# Patient Record
Sex: FEMALE | Race: WHITE | NOT HISPANIC OR LATINO | Employment: OTHER | URBAN - METROPOLITAN AREA
[De-identification: names, ages, dates, MRNs, and addresses within clinical notes are randomized per-mention and may not be internally consistent; named-entity substitution may affect disease eponyms.]

---

## 2024-06-03 NOTE — H&P (VIEW-ONLY)
[facility]      Preoperative Consultation   Sheridan Ahuja   : 1955   Gender: female    Date of Encounter: 6/3/2024    Nursing Notes:   Mikayla Mcdowell  6/3/2024  7:57 AM  Signed  Sheridan Ahuja is a 69 y.o. female (1955) who presents for preop evaluation undergoing Stimulator for bladder.    Date of Surgery: 2024  Surgical Specialty: Urology  Tammi Maxwell MD - [facility]  [facility]Surgical Facility: [facility]  Fax number: [phone]  Surgery type: outpatient  Primary Physician: Gertrudis Aldridge        History of Present Illness   Ms. Sheridan Ahuja presents for preoperative physical.  No chest pain or shortness of breath.  History of Present Illness         Review of Systems   A comprehensive review of systems was negative except for items noted in HPI.    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     calcium carbonate/vitamin D3 (CALTRATE 600 PLUS D ORAL) Take by mouth.     clobetasol 0.05% TOPICAL (TEMOVATE) 0.05 % external solution      CPAP CPAP ()  at 9-13cmw , MASK OF CHOICE ( or ) w/ full face cushion () x1/mo, nasal cushion () x2/mo, or nasal pillows () x 2/mo; Length of Need: 99 months; Frequency of use: Daily  Heated humidifier () x 1/5  year, Humidifier chamber () x 1/6mo, Chinstrap () x 1/6mo, Heated tubing () x 1/3mo, Headgear () x 1/6mo, Filters: Disposable () x 2pk/1mo & Reusable () x 1pk/6mo     fesoterodine (Toviaz) 4 mg Tb24 Extended-Release tablet Toviaz 4 mg tablet,extended release     fluticasone (50 mcg per actuation) nasal solution (FLONASE) Inhale 1 Spray to both nostrils once daily.     hydrocortisone (HYTONE) 2.5 % ointment      multivitamin (VITAMIN DAILY ORAL) Take by mouth. Vitamin D     omeprazole (PRILOSEC) 20 mg Delayed-Release capsule Take 1 Capsule (20 mg) by mouth once daily before a meal.     simvastatin (ZOCOR) 20 mg tablet Take 1 Tablet (20 mg) by mouth once daily in the evening.     triamcinolone (ARISTOCORT) 0.1 % ointment not taking     vibegron (Gemtesa) 75 mg tablet Take 1 Tablet (75 mg) by mouth once daily.     No current facility-administered medications for this visit.     Medications have been reviewed by me and are current to the best of my knowledge and ability.     Allergies   Allergen Reactions     Asa Buff (Mag Carb-Al Glyc) [Aspirin, Buffered] Edema     Aspirin,Buffd-Calcium Carb-Mag *Unknown     Darifenacin Rash     Oxybutynin Rash     Oxybutynin Chloride *Unknown     Past Surgical History:   . Laterality Date     DILATION AND CURETTAGE  2008     KNEE SURGERY Right 2023    ACL and MM repair     Left breast magnetic seed-localized lumpectomy and left axillary sentinel lymph node biopsy  08/15/2022    Dr. Barfield     TOTAL ABDOMINAL HYSTERECTOMY  2009    still has ovaries; uterine prolapse; bladder suspension     Social History     Tobacco Use     Smoking status: Former     Current packs/day: 0.00     Average packs/day: 0.5 packs/day for 10.0 years (5.0 ttl pk-yrs)     Types: Cigarettes     Start date: 1970     Quit date: 1980     Years since quittin.1     Smokeless tobacco: Never     Tobacco comments:     smoked off and on for 10 years   Vaping  "Use     Vaping status: Never Used   Substance Use Topics     Alcohol use: Yes     Alcohol/week: 2.5 - 3.3 standard drinks of alcohol     Types: 3 - 4 Standard drinks or equivalent per week     Drug use: No     Comment: caff: 2 cup coffee in AM     Family History   Problem Relation Age of Onset     Hyperlipidemia Mother      Heart Disease Father         high cholesterol     Cancer-prostate Father      Good Health Sister      Cancer Brother         lymphoma     Other Brother         quadraplegic -- MVA     Good Health Brother      Good Health Brother      Good Health Brother      Diabetes Maternal Grandmother      Cancer-breast Paternal Grandmother      Cancer Paternal Grandfather         unknown     Cancer-ovarian No Family History      Results      PAST DIFFICULTY WITH ANESTHESIA: None     Physical Exam   /60 (Cuff Site: Right Arm, Position: Sitting, Cuff Size: Adult Regular)   Pulse 64   Ht 1.6 m (5' 3\")   Wt 77.7 kg (171 lb 4.8 oz)   BMI 30.34 kg/m   Body mass index is 30.34 kg/m .  Physical Exam    Const: NAD, A&Ox3   Eyes: Anicteric, EOMI   Respiratory: CTAB   Cardiac: RRR, 2+ left leg swelling.  Trace on the right.  No calf tenderness.  Skin: No concerning lesions, rashes or jaundice   Neuro: Gait normal, no tremor  Psych: Appropriate mood      Assessment / Plan     The Pre-Op Tool    Recommendations      Low Risk Procedure    Cardiac History  No history of coronary artery disease    Sleep Apnea  History of obstructive sleep apnea, on CPAP           Labs  UA/UC within previous 7 days  EKG  Not indicated  Stress Testing  Not indicated    * Testing recommendations are intended to assist, but not direct, clinical decisions.            Labs  * Data supports elimination of  routine  laboratory testing in favor of focused,  indicated  testing based on medical co-morbidities. A 2009 study randomized 1061 patients undergoing ambulatory, non-cataract surgery to routine or to indicated testing. Perioperative " adverse events were similar (Anesthesia & Analgesia 2009;108:467-75; Anesthesiol. Clin. 2016 Mar;34(1):43-58).  EKG  * The ACC/AHA recommends against obtaining routine EKGs in patients undergoing low risk surgeries, a class IIa recommendation (JACC. 2014;64(21);e1-76).     Session ID: [id number]  Endnotes and bibliography available upon request: [email or web address]  Labs: yes  ECG: no    ICD-10-CM    1. Medicare annual wellness visit, subsequent  Z00.00       2. Hyperlipidemia, unspecified hyperlipidemia type  E78.5 LIPID PANEL W REFLEX MEASURED LDL      3. Liver lesion  K76.9 HEPATIC FUNCTION PANEL     CANCELED: CBC W PLT NO DIFF      4. Hyperglycemia  R73.9 HEMOGLOBIN A1C SCREENING     BASIC METABOLIC PANEL      5. Vitamin D deficiency  E55.9 VITAMIN D 25 (DEFICIENCY)      6. Eustachian tube dysfunction, unspecified laterality  H69.90       7. Non-intractable vomiting with nausea  R11.2       8. Visit for screening mammogram  Z12.31 XR MAMMO BILAT SCREENING [[id number]]      9. Screening for colon cancer  Z12.11 OCCULT BLOOD IFOBT STOOL [[id number]]      10. Screening for diabetes mellitus (DM)  Z13.1 CANCELED: GLUCOSE, FASTING [27309.2]      11. Fatigue, unspecified type  R53.83       12. Left leg swelling  M79.89       13. Leg cramping  R25.2         Assessment & Plan    Patient is cleared for planned procedure.   Electronically Signed by:   Gertrudis Aldridge MD   6/3/2024

## 2024-06-03 NOTE — H&P (VIEW-ONLY)
[facility]      Preoperative Consultation   Sheridan Ahuja   : 1955   Gender: female    Date of Encounter: 6/3/2024    Nursing Notes:   Mikayla Mcdowell  6/3/2024  7:57 AM  Signed  Sheridan Ahuja is a 69 y.o. female (1955) who presents for preop evaluation undergoing Stimulator for bladder.    Date of Surgery: 2024  Surgical Specialty: Urology  Tammi Maxwell MD - [facility]  [facility]/Surgical Facility: [facility]  Fax number: [phone]  Surgery type: outpatient  Primary Physician: Gertrudis Aldridge        History of Present Illness   Ms. Sheridan Ahuja presents for preoperative physical.  No chest pain or shortness of breath.  History of Present Illness         Review of Systems   A comprehensive review of systems was negative except for items noted in HPI.    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     calcium carbonate/vitamin D3 (CALTRATE 600 PLUS D ORAL) Take by mouth.     clobetasol 0.05% TOPICAL (TEMOVATE) 0.05 % external solution      CPAP CPAP ()  at 9-13cmw , MASK OF CHOICE ( or ) w/ full face cushion () x1/mo, nasal cushion () x2/mo, or nasal pillows () x 2/mo; Length of Need: 99 months; Frequency of use: Daily  Heated humidifier () x 1/5  year, Humidifier chamber () x 1/6mo, Chinstrap () x 1/6mo, Heated tubing () x 1/3mo, Headgear () x 1/6mo, Filters: Disposable () x 2pk/1mo & Reusable () x 1pk/6mo     fesoterodine (Toviaz) 4 mg Tb24 Extended-Release tablet Toviaz 4 mg tablet,extended release     fluticasone (50 mcg per actuation) nasal solution (FLONASE) Inhale 1 Spray to both nostrils once daily.     hydrocortisone (HYTONE) 2.5 % ointment      multivitamin (VITAMIN DAILY ORAL) Take by mouth. Vitamin D     omeprazole (PRILOSEC) 20 mg Delayed-Release capsule Take 1 Capsule (20 mg) by mouth once daily before a meal.     simvastatin (ZOCOR) 20 mg tablet Take 1 Tablet (20 mg) by mouth once daily in the evening.     triamcinolone (ARISTOCORT) 0.1 % ointment not taking     vibegron (Gemtesa) 75 mg tablet Take 1 Tablet (75 mg) by mouth once daily.     No current facility-administered medications for this visit.     Medications have been reviewed by me and are current to the best of my knowledge and ability.     Allergies   Allergen Reactions     Asa Buff (Mag Carb-Al Glyc) [Aspirin, Buffered] Edema     Aspirin,Buffd-Calcium Carb-Mag *Unknown     Darifenacin Rash     Oxybutynin Rash     Oxybutynin Chloride *Unknown     Past Surgical History:   . Laterality Date     DILATION AND CURETTAGE  2008     KNEE SURGERY Right 2023    ACL and MM repair     Left breast magnetic seed-localized lumpectomy and left axillary sentinel lymph node biopsy  08/15/2022    Dr. Barfield     TOTAL ABDOMINAL HYSTERECTOMY  2009    still has ovaries; uterine prolapse; bladder suspension     Social History     Tobacco Use     Smoking status: Former     Current packs/day: 0.00     Average packs/day: 0.5 packs/day for 10.0 years (5.0 ttl pk-yrs)     Types: Cigarettes     Start date: 1970     Quit date: 1980     Years since quittin.1     Smokeless tobacco: Never     Tobacco comments:     smoked off and on for 10 years   Vaping  "Use     Vaping status: Never Used   Substance Use Topics     Alcohol use: Yes     Alcohol/week: 2.5 - 3.3 standard drinks of alcohol     Types: 3 - 4 Standard drinks or equivalent per week     Drug use: No     Comment: caff: 2 cup coffee in AM     Family History   Problem Relation Age of Onset     Hyperlipidemia Mother      Heart Disease Father         high cholesterol     Cancer-prostate Father      Good Health Sister      Cancer Brother         lymphoma     Other Brother         quadraplegic -- MVA     Good Health Brother      Good Health Brother      Good Health Brother      Diabetes Maternal Grandmother      Cancer-breast Paternal Grandmother      Cancer Paternal Grandfather         unknown     Cancer-ovarian No Family History      Results      PAST DIFFICULTY WITH ANESTHESIA: None     Physical Exam   /60 (Cuff Site: Right Arm, Position: Sitting, Cuff Size: Adult Regular)   Pulse 64   Ht 1.6 m (5' 3\")   Wt 77.7 kg (171 lb 4.8 oz)   BMI 30.34 kg/m   Body mass index is 30.34 kg/m .  Physical Exam    Const: NAD, A&Ox3   Eyes: Anicteric, EOMI   Respiratory: CTAB   Cardiac: RRR, 2+ left leg swelling.  Trace on the right.  No calf tenderness.  Skin: No concerning lesions, rashes or jaundice   Neuro: Gait normal, no tremor  Psych: Appropriate mood      Assessment / Plan     The Pre-Op Tool    Recommendations      Low Risk Procedure    Cardiac History  No history of coronary artery disease    Sleep Apnea  History of obstructive sleep apnea, on CPAP           Labs  UA/UC within previous 7 days  EKG  Not indicated  Stress Testing  Not indicated    * Testing recommendations are intended to assist, but not direct, clinical decisions.            Labs  * Data supports elimination of  routine  laboratory testing in favor of focused,  indicated  testing based on medical co-morbidities. A 2009 study randomized 1061 patients undergoing ambulatory, non-cataract surgery to routine or to indicated testing. Perioperative " adverse events were similar (Anesthesia & Analgesia 2009;108:467-75; Anesthesiol. Clin. 2016 Mar;34(1):43-58).  EKG  * The ACC/AHA recommends against obtaining routine EKGs in patients undergoing low risk surgeries, a class IIa recommendation (JACC. 2014;64(21);e1-76).     Session ID: [id number]  Endnotes and bibliography available upon request: [email or web address]  Labs: yes  ECG: no    ICD-10-CM    1. Medicare annual wellness visit, subsequent  Z00.00       2. Hyperlipidemia, unspecified hyperlipidemia type  E78.5 LIPID PANEL W REFLEX MEASURED LDL      3. Liver lesion  K76.9 HEPATIC FUNCTION PANEL     CANCELED: CBC W PLT NO DIFF      4. Hyperglycemia  R73.9 HEMOGLOBIN A1C SCREENING     BASIC METABOLIC PANEL      5. Vitamin D deficiency  E55.9 VITAMIN D 25 (DEFICIENCY)      6. Eustachian tube dysfunction, unspecified laterality  H69.90       7. Non-intractable vomiting with nausea  R11.2       8. Visit for screening mammogram  Z12.31 XR MAMMO BILAT SCREENING [[id number]]      9. Screening for colon cancer  Z12.11 OCCULT BLOOD IFOBT STOOL [[id number]]      10. Screening for diabetes mellitus (DM)  Z13.1 CANCELED: GLUCOSE, FASTING [84569.2]      11. Fatigue, unspecified type  R53.83       12. Left leg swelling  M79.89       13. Leg cramping  R25.2         Assessment & Plan    Patient is cleared for planned procedure.   Electronically Signed by:   Gertrudis Aldridge MD   6/3/2024

## 2024-06-13 RX ORDER — SIMVASTATIN 20 MG
20 TABLET ORAL AT BEDTIME
COMMUNITY
Start: 2022-08-01

## 2024-06-17 ENCOUNTER — ANESTHESIA (OUTPATIENT)
Dept: SURGERY | Facility: CLINIC | Age: 69
End: 2024-06-17
Payer: COMMERCIAL

## 2024-06-17 ENCOUNTER — ANESTHESIA EVENT (OUTPATIENT)
Dept: SURGERY | Facility: CLINIC | Age: 69
End: 2024-06-17
Payer: COMMERCIAL

## 2024-06-17 ENCOUNTER — HOSPITAL ENCOUNTER (OUTPATIENT)
Facility: CLINIC | Age: 69
Discharge: HOME | End: 2024-06-17
Attending: UROLOGY | Admitting: UROLOGY
Payer: COMMERCIAL

## 2024-06-17 ENCOUNTER — APPOINTMENT (OUTPATIENT)
Dept: RADIOLOGY | Facility: CLINIC | Age: 69
End: 2024-06-17
Attending: UROLOGY
Payer: COMMERCIAL

## 2024-06-17 VITALS
SYSTOLIC BLOOD PRESSURE: 144 MMHG | RESPIRATION RATE: 20 BRPM | OXYGEN SATURATION: 97 % | HEART RATE: 76 BPM | WEIGHT: 171 LBS | TEMPERATURE: 97 F | BODY MASS INDEX: 30.3 KG/M2 | DIASTOLIC BLOOD PRESSURE: 78 MMHG | HEIGHT: 63 IN

## 2024-06-17 DIAGNOSIS — N32.81 OVERACTIVE BLADDER: Primary | ICD-10-CM

## 2024-06-17 PROCEDURE — 250N000009 HC RX 250: Performed by: NURSE ANESTHETIST, CERTIFIED REGISTERED

## 2024-06-17 PROCEDURE — 250N000011 HC RX IP 250 OP 636: Mod: JZ | Performed by: ANESTHESIOLOGY

## 2024-06-17 PROCEDURE — 250N000011 HC RX IP 250 OP 636: Performed by: NURSE PRACTITIONER

## 2024-06-17 PROCEDURE — 710N000010 HC RECOVERY PHASE 1, LEVEL 2, PER MIN: Performed by: UROLOGY

## 2024-06-17 PROCEDURE — C1778 LEAD, NEUROSTIMULATOR: HCPCS | Performed by: UROLOGY

## 2024-06-17 PROCEDURE — 272N000001 HC OR GENERAL SUPPLY STERILE: Performed by: UROLOGY

## 2024-06-17 PROCEDURE — 258N000003 HC RX IP 258 OP 636: Mod: JZ | Performed by: NURSE ANESTHETIST, CERTIFIED REGISTERED

## 2024-06-17 PROCEDURE — 250N000009 HC RX 250: Performed by: UROLOGY

## 2024-06-17 PROCEDURE — 370N000017 HC ANESTHESIA TECHNICAL FEE, PER MIN: Performed by: UROLOGY

## 2024-06-17 PROCEDURE — 360N000083 HC SURGERY LEVEL 3 W/ FLUORO, PER MIN: Performed by: UROLOGY

## 2024-06-17 PROCEDURE — C1767 GENERATOR, NEURO NON-RECHARG: HCPCS | Performed by: UROLOGY

## 2024-06-17 PROCEDURE — C1883 ADAPT/EXT, PACING/NEURO LEAD: HCPCS | Performed by: UROLOGY

## 2024-06-17 PROCEDURE — 250N000025 HC SEVOFLURANE, PER MIN: Performed by: UROLOGY

## 2024-06-17 PROCEDURE — 999N000182 XR SURGERY CARM FLUORO GREATER THAN 5 MIN: Mod: TC

## 2024-06-17 PROCEDURE — 258N000003 HC RX IP 258 OP 636: Mod: JZ | Performed by: ANESTHESIOLOGY

## 2024-06-17 PROCEDURE — 999N000141 HC STATISTIC PRE-PROCEDURE NURSING ASSESSMENT: Performed by: UROLOGY

## 2024-06-17 PROCEDURE — 710N000012 HC RECOVERY PHASE 2, PER MINUTE: Performed by: UROLOGY

## 2024-06-17 PROCEDURE — 250N000013 HC RX MED GY IP 250 OP 250 PS 637: Performed by: STUDENT IN AN ORGANIZED HEALTH CARE EDUCATION/TRAINING PROGRAM

## 2024-06-17 DEVICE — CABLE EXTENSION 4.32MM 3560022
Type: IMPLANTABLE DEVICE | Site: BLADDER | Status: NON-FUNCTIONAL
Removed: 2024-07-01

## 2024-06-17 DEVICE — LEAD NEUROSTIMULATOR 28CM INTE 978B128: Type: IMPLANTABLE DEVICE | Site: BLADDER | Status: FUNCTIONAL

## 2024-06-17 RX ORDER — NALOXONE HYDROCHLORIDE 0.4 MG/ML
0.1 INJECTION, SOLUTION INTRAMUSCULAR; INTRAVENOUS; SUBCUTANEOUS
Status: DISCONTINUED | OUTPATIENT
Start: 2024-06-17 | End: 2024-06-17 | Stop reason: HOSPADM

## 2024-06-17 RX ORDER — CEFAZOLIN SODIUM/WATER 2 G/20 ML
2 SYRINGE (ML) INTRAVENOUS SEE ADMIN INSTRUCTIONS
Status: DISCONTINUED | OUTPATIENT
Start: 2024-06-17 | End: 2024-06-17 | Stop reason: HOSPADM

## 2024-06-17 RX ORDER — BUPIVACAINE HYDROCHLORIDE AND EPINEPHRINE 2.5; 5 MG/ML; UG/ML
INJECTION, SOLUTION EPIDURAL; INFILTRATION; INTRACAUDAL; PERINEURAL PRN
Status: DISCONTINUED | OUTPATIENT
Start: 2024-06-17 | End: 2024-06-17 | Stop reason: HOSPADM

## 2024-06-17 RX ORDER — LIDOCAINE HYDROCHLORIDE 10 MG/ML
INJECTION, SOLUTION INFILTRATION; PERINEURAL PRN
Status: DISCONTINUED | OUTPATIENT
Start: 2024-06-17 | End: 2024-06-17 | Stop reason: HOSPADM

## 2024-06-17 RX ORDER — DEXAMETHASONE SODIUM PHOSPHATE 10 MG/ML
4 INJECTION, SOLUTION INTRAMUSCULAR; INTRAVENOUS
Status: DISCONTINUED | OUTPATIENT
Start: 2024-06-17 | End: 2024-06-17 | Stop reason: HOSPADM

## 2024-06-17 RX ORDER — ONDANSETRON 4 MG/1
4 TABLET, ORALLY DISINTEGRATING ORAL EVERY 30 MIN PRN
Status: DISCONTINUED | OUTPATIENT
Start: 2024-06-17 | End: 2024-06-17 | Stop reason: HOSPADM

## 2024-06-17 RX ORDER — OXYCODONE HYDROCHLORIDE 5 MG/1
10 TABLET ORAL
Status: DISCONTINUED | OUTPATIENT
Start: 2024-06-17 | End: 2024-06-17 | Stop reason: HOSPADM

## 2024-06-17 RX ORDER — SODIUM CHLORIDE, SODIUM LACTATE, POTASSIUM CHLORIDE, CALCIUM CHLORIDE 600; 310; 30; 20 MG/100ML; MG/100ML; MG/100ML; MG/100ML
INJECTION, SOLUTION INTRAVENOUS CONTINUOUS
Status: DISCONTINUED | OUTPATIENT
Start: 2024-06-17 | End: 2024-06-17 | Stop reason: HOSPADM

## 2024-06-17 RX ORDER — OXYCODONE HYDROCHLORIDE 5 MG/1
5 TABLET ORAL
Status: DISCONTINUED | OUTPATIENT
Start: 2024-06-17 | End: 2024-06-17 | Stop reason: HOSPADM

## 2024-06-17 RX ORDER — DEXAMETHASONE SODIUM PHOSPHATE 4 MG/ML
4 INJECTION, SOLUTION INTRA-ARTICULAR; INTRALESIONAL; INTRAMUSCULAR; INTRAVENOUS; SOFT TISSUE
Status: DISCONTINUED | OUTPATIENT
Start: 2024-06-17 | End: 2024-06-17 | Stop reason: HOSPADM

## 2024-06-17 RX ORDER — DEXAMETHASONE SODIUM PHOSPHATE 10 MG/ML
INJECTION, SOLUTION INTRAMUSCULAR; INTRAVENOUS PRN
Status: DISCONTINUED | OUTPATIENT
Start: 2024-06-17 | End: 2024-06-17

## 2024-06-17 RX ORDER — PROPOFOL 10 MG/ML
INJECTION, EMULSION INTRAVENOUS CONTINUOUS PRN
Status: DISCONTINUED | OUTPATIENT
Start: 2024-06-17 | End: 2024-06-17

## 2024-06-17 RX ORDER — ONDANSETRON 2 MG/ML
INJECTION INTRAMUSCULAR; INTRAVENOUS PRN
Status: DISCONTINUED | OUTPATIENT
Start: 2024-06-17 | End: 2024-06-17

## 2024-06-17 RX ORDER — PROPOFOL 10 MG/ML
INJECTION, EMULSION INTRAVENOUS PRN
Status: DISCONTINUED | OUTPATIENT
Start: 2024-06-17 | End: 2024-06-17

## 2024-06-17 RX ORDER — ONDANSETRON 2 MG/ML
4 INJECTION INTRAMUSCULAR; INTRAVENOUS EVERY 30 MIN PRN
Status: DISCONTINUED | OUTPATIENT
Start: 2024-06-17 | End: 2024-06-17 | Stop reason: HOSPADM

## 2024-06-17 RX ORDER — FENTANYL CITRATE 50 UG/ML
INJECTION, SOLUTION INTRAMUSCULAR; INTRAVENOUS PRN
Status: DISCONTINUED | OUTPATIENT
Start: 2024-06-17 | End: 2024-06-17

## 2024-06-17 RX ORDER — CEFAZOLIN SODIUM/WATER 2 G/20 ML
2 SYRINGE (ML) INTRAVENOUS
Status: DISCONTINUED | OUTPATIENT
Start: 2024-06-17 | End: 2024-06-17 | Stop reason: HOSPADM

## 2024-06-17 RX ORDER — HYDROMORPHONE HCL IN WATER/PF 6 MG/30 ML
0.4 PATIENT CONTROLLED ANALGESIA SYRINGE INTRAVENOUS EVERY 5 MIN PRN
Status: DISCONTINUED | OUTPATIENT
Start: 2024-06-17 | End: 2024-06-17 | Stop reason: HOSPADM

## 2024-06-17 RX ORDER — HYDROCODONE BITARTRATE AND ACETAMINOPHEN 5; 325 MG/1; MG/1
1-2 TABLET ORAL EVERY 4 HOURS PRN
Qty: 5 TABLET | Refills: 0 | Status: SHIPPED | OUTPATIENT
Start: 2024-06-17

## 2024-06-17 RX ORDER — FENTANYL CITRATE 50 UG/ML
25 INJECTION, SOLUTION INTRAMUSCULAR; INTRAVENOUS EVERY 5 MIN PRN
Status: DISCONTINUED | OUTPATIENT
Start: 2024-06-17 | End: 2024-06-17 | Stop reason: HOSPADM

## 2024-06-17 RX ORDER — HYDROMORPHONE HCL IN WATER/PF 6 MG/30 ML
0.2 PATIENT CONTROLLED ANALGESIA SYRINGE INTRAVENOUS EVERY 5 MIN PRN
Status: DISCONTINUED | OUTPATIENT
Start: 2024-06-17 | End: 2024-06-17 | Stop reason: HOSPADM

## 2024-06-17 RX ORDER — ACETAMINOPHEN 325 MG/1
650 TABLET ORAL EVERY 6 HOURS PRN
Status: DISCONTINUED | OUTPATIENT
Start: 2024-06-17 | End: 2024-06-17 | Stop reason: HOSPADM

## 2024-06-17 RX ORDER — LIDOCAINE 40 MG/G
CREAM TOPICAL
Status: DISCONTINUED | OUTPATIENT
Start: 2024-06-17 | End: 2024-06-17 | Stop reason: HOSPADM

## 2024-06-17 RX ORDER — HYDROCODONE BITARTRATE AND ACETAMINOPHEN 5; 325 MG/1; MG/1
1 TABLET ORAL EVERY 4 HOURS PRN
Status: DISCONTINUED | OUTPATIENT
Start: 2024-06-17 | End: 2024-06-17 | Stop reason: HOSPADM

## 2024-06-17 RX ORDER — FENTANYL CITRATE 50 UG/ML
50 INJECTION, SOLUTION INTRAMUSCULAR; INTRAVENOUS EVERY 5 MIN PRN
Status: DISCONTINUED | OUTPATIENT
Start: 2024-06-17 | End: 2024-06-17 | Stop reason: HOSPADM

## 2024-06-17 RX ADMIN — SODIUM CHLORIDE, POTASSIUM CHLORIDE, SODIUM LACTATE AND CALCIUM CHLORIDE: 600; 310; 30; 20 INJECTION, SOLUTION INTRAVENOUS at 08:19

## 2024-06-17 RX ADMIN — PROPOFOL 75 MCG/KG/MIN: 10 INJECTION, EMULSION INTRAVENOUS at 09:00

## 2024-06-17 RX ADMIN — DEXAMETHASONE SODIUM PHOSPHATE 10 MG: 10 INJECTION, SOLUTION INTRAMUSCULAR; INTRAVENOUS at 09:25

## 2024-06-17 RX ADMIN — FENTANYL CITRATE 25 MCG: 50 INJECTION INTRAMUSCULAR; INTRAVENOUS at 09:50

## 2024-06-17 RX ADMIN — Medication 100 MG: at 08:57

## 2024-06-17 RX ADMIN — ONDANSETRON 4 MG: 2 INJECTION INTRAMUSCULAR; INTRAVENOUS at 08:56

## 2024-06-17 RX ADMIN — PROPOFOL 40 MG: 10 INJECTION, EMULSION INTRAVENOUS at 10:19

## 2024-06-17 RX ADMIN — FENTANYL CITRATE 25 MCG: 50 INJECTION INTRAMUSCULAR; INTRAVENOUS at 10:06

## 2024-06-17 RX ADMIN — DEXMEDETOMIDINE HYDROCHLORIDE 12 MCG: 100 INJECTION, SOLUTION INTRAVENOUS at 08:50

## 2024-06-17 RX ADMIN — ACETAMINOPHEN 650 MG: 325 TABLET ORAL at 11:58

## 2024-06-17 RX ADMIN — FENTANYL CITRATE 50 MCG: 50 INJECTION INTRAMUSCULAR; INTRAVENOUS at 09:16

## 2024-06-17 RX ADMIN — PROPOFOL 150 MG: 10 INJECTION, EMULSION INTRAVENOUS at 08:57

## 2024-06-17 RX ADMIN — DEXMEDETOMIDINE HYDROCHLORIDE 8 MCG: 100 INJECTION, SOLUTION INTRAVENOUS at 09:07

## 2024-06-17 RX ADMIN — Medication 2 G: at 08:54

## 2024-06-17 ASSESSMENT — ACTIVITIES OF DAILY LIVING (ADL)
ADLS_ACUITY_SCORE: 29

## 2024-06-17 NOTE — PROGRESS NOTES
Patient listed friend, Annie, as her help after discharge. When asked if Annie would be staying overnight with patient tonight, patient confirmed she would. However, when Dr. Maxwell saw patient, patient admitted Annie wasn't planning to spend the night. Patient didn't bring cell phone to hospital. Annie calling other friends of patient to see if they could stay with patient overnight. Patient confident she will find someone. Annie confirms she will stay with patient until overnight care available. Dr. Duke notified,

## 2024-06-17 NOTE — BRIEF OP NOTE
[facility]    Brief Operative Note    Pre-operative diagnosis: * No pre-op diagnosis entered *  Post-operative diagnosis Same as pre-operative diagnosis    Procedure:    Surgeon: * No surgeons found in log *  Anesthesia: * No anesthesia type entered *   Estimated Blood Loss: None    Drains: None  Specimens: * No specimens in log *  Findings:   None.  Complications: None.  Implants: * No implants in log *

## 2024-06-17 NOTE — OP NOTE
Date of Service: 6-        Surgeon(s):   Tammi Maxwell MD      Assistant:   Linda Phillip MD       Preoperative Diagnosis: Overactive bladder urge incontinence  Postoperative Diagnosis: Overactive bladder urge incontinence      Procedure(s):   INTERSTIM Lead SureScan technology stage 1 lead      Procedure findings:   Right IPG  pocket/ RIGHT Surescan technology InterStim lead S3        Procedure(s) findings:    Bellow/Toe ~   0 bellow 1.4  toe 1.3  1 bellow  1.9  toe  1.9  2 bellow  --- toe  2.0  3 bellow  1.7 toe 1.4     (EBL) Estimated blood loss (ml): 3cc      Anesthesia: Local, general        Indications:       70 yo female with history of OVERACTIVE BLADDER/urge incontinence. She was seen and evaluated at [facility] and elected for interstim stage 1 trial. All risk/benefits and alternatives were discussed and she agreed to proceed.      Procedure:    Patient was correctly identified and informed consent was obtained. She was prepped and drapped in the usual sterile surgical fashion prone. . Anesthesia was given. Pressure points were padded appropriately. A time out was called with everyone in the room in agreement. IV antibiotics were dosed     Using floroscopy, the lateral edges of the sacrum were identified and marked in a PA position. In the lateral tragetory, the location of the S3 foramen was identified using a hemostat. Local anesthesia was given in the area. Using a 3 and 5 inch foramen needle I then marched down the bony plate until the S3 foramen was located. The needle was advanced into the formen till the edge. It was tested with motor responses and bellow/toe was noted on the right higher stim was noted on the left it was more toe predominate.. It was repositioned to give the best responses for bellow/toe in low thresholds and to give the most ideal angle for entrance. Using a 15 blade a small nick was made along side the needle and the inner sheath was removed and a wire was  placed to the edge of the bony plate. The outer needle sheath was removed. Over the wire, the dilator was positioning such that the radiopaque anita was in the middle of the bony plate using constant fluoroscopy. The inside metal sheath and wire was removed and the lead was placed. Using constant fluorsoscopy the lead was floated such that it appeared like a hockey stick. The lead when tested was very toe heavy at low thresholds so it was removed.     In a similair manner a lead was placed ont eh right side, it had better toe/bellow responses although higher it was less overall toe predominate. Once in a good position, the lead was tested with very good bellow and toe responses at low thresholds. The lead was then deployed and findings for motor responses were rechecked and images were saved in PA and lateral views.      Attention was then drawn to the right upper buttock. In preop she was marked for where the most ideal location for the IPG would be located. It was inferior to iliac crest and lateral to the sacrum. Local anesthesia was given. A 3 cm horizontal incision was made. I created a pocket for the IPG in the subcutneous tissues. It was irrigated and hemostatic. The lead was tunneled  to the pocket. The external connector was connected to the lead.the lead was screwed with scrw and secured with vicryl to itself.  The connection device was was placed into the pocket.       The incisions were then closed with a 2-vicryl in an interrupted 2 layer fashion and the skin was closed with a 3-0 strafix.The incisions were cleaned and dermabond was applied. A mepilex was used over the external connection device.     All sponge and instruments counts were correct. There were no complications. I was present and scrubbed for the entire case.   She will be programmed with the [employer] representative in the PACU and follow-up with [facility] within the week.

## 2024-06-17 NOTE — ANESTHESIA POSTPROCEDURE EVALUATION
Patient: Sheridan Ahuja    Procedure: Procedure(s):  INTERSTIM STAGE I LEAD PLACEMENT       Anesthesia Type:  General    Note:     Postop Pain Control: Uneventful            Sign Out: Well controlled pain   PONV: No   Neuro/Psych: Uneventful            Sign Out: Acceptable/Baseline neuro status   Airway/Respiratory: Uneventful            Sign Out: Acceptable/Baseline resp. status   CV/Hemodynamics: Uneventful            Sign Out: Acceptable CV status; No obvious hypovolemia; No obvious fluid overload   Other NRE: NONE   DID A NON-ROUTINE EVENT OCCUR? No           Last vitals:  Vitals Value Taken Time   /70 06/17/24 1050   Temp 36.2  C (97.1  F) 06/17/24 1030   Pulse 68 06/17/24 1058   Resp 9 06/17/24 1058   SpO2 97 % 06/17/24 1058   Vitals shown include unfiled device data.    Electronically Signed By: Allan Duke MD

## 2024-06-17 NOTE — ANESTHESIA PREPROCEDURE EVALUATION
"Anesthesia Pre-Procedure Evaluation    Patient: Sheridan Ahuja   MRN: [id number] : 1955        Procedure : Procedure(s):  INTERSTIM STAGE I LEAD PLACEMENT          Past Medical History:   Diagnosis Date    Gastroesophageal reflux disease     Sleep apnea       History reviewed. No pertinent surgical history.   Allergies   Allergen Reactions    Aspirin Swelling    Other Drug Allergy (See Comments) Swelling     Several bladder meds caused swelling but patient doesn't remember the names of the meds      Social History     Tobacco Use    Smoking status: Never    Smokeless tobacco: Never   Substance Use Topics    Alcohol use: Not Currently      Wt Readings from Last 1 Encounters:   24 77.6 kg (171 lb)        Anesthesia Evaluation   Pt has had prior anesthetic.     No history of anesthetic complications       ROS/MED HX  ENT/Pulmonary:     (+) sleep apnea,                                       Neurologic:  - neg neurologic ROS     Cardiovascular:  - neg cardiovascular ROS     METS/Exercise Tolerance:     Hematologic:  - neg hematologic  ROS     Musculoskeletal:       GI/Hepatic:     (+) GERD (controlled),                   Renal/Genitourinary:  - neg Renal ROS     Endo:  - neg endo ROS     Psychiatric/Substance Use:       Infectious Disease:       Malignancy:       Other:            Physical Exam    Airway        Mallampati: III   TM distance: > 3 FB   Neck ROM: full   Mouth opening: > 3 cm    Respiratory Devices and Support         Dental       (+) Minor Abnormalities - some fillings, tiny chips      Cardiovascular   cardiovascular exam normal          Pulmonary   pulmonary exam normal                OUTSIDE LABS:  CBC: No results found for: \"WBC\", \"HGB\", \"HCT\", \"PLT\"  BMP: No results found for: \"NA\", \"POTASSIUM\", \"CHLORIDE\", \"CO2\", \"BUN\", \"CR\", \"GLC\"  COAGS: No results found for: \"PTT\", \"INR\", \"FIBR\"  POC: No results found for: \"BGM\", \"HCG\", \"HCGS\"  HEPATIC: No results found for: \"ALBUMIN\", \"PROTTOTAL\", " "\"ALT\", \"AST\", \"GGT\", \"ALKPHOS\", \"BILITOTAL\", \"BILIDIRECT\", \"MARY ANN\"  OTHER: No results found for: \"PH\", \"LACT\", \"A1C\", \"ALEXA\", \"PHOS\", \"MAG\", \"LIPASE\", \"AMYLASE\", \"TSH\", \"T4\", \"T3\", \"CRP\", \"SED\"    Anesthesia Plan    ASA Status:  2    NPO Status:  NPO Appropriate    Anesthesia Type: General.     - Airway: ETT   Induction: Intravenous.           Consents    Anesthesia Plan(s) and associated risks, benefits, and realistic alternatives discussed. Questions answered and patient/representative(s) expressed understanding.     - Discussed: Risks, Benefits and Alternatives for the PROCEDURE were discussed     - Discussed with:  Patient            Postoperative Care    Pain management: IV analgesics, Oral pain medications.   PONV prophylaxis: Ondansetron (or other 5HT-3), Dexamethasone or Solumedrol     Comments:               Allan Duke MD    I have reviewed the pertinent notes and labs in the chart from the past 30 days and (re)examined the patient.  Any updates or changes from those notes are reflected in this note.              # Obesity: Estimated body mass index is 30.29 kg/m  as calculated from the following:    Height as of this encounter: 1.6 m (5' 3\").    Weight as of this encounter: 77.6 kg (171 lb).      "

## 2024-06-17 NOTE — ANESTHESIA PROCEDURE NOTES
Airway       Patient location during procedure: OR       Procedure Start/Stop Times: 6/17/2024 8:59 AM  Staff -        CRNA: Herlinda Weston APRN CRNA       Performed By: CRNA  Consent for Airway        Urgency: elective  Indications and Patient Condition       Indications for airway management: starla-procedural       Induction type:intravenous       Mask difficulty assessment: 1 - vent by mask    Final Airway Details       Final airway type: endotracheal airway       Successful airway: ETT - single and Oral  Endotracheal Airway Details        ETT size (mm): 7.0       Cuffed: yes       Successful intubation technique: direct laryngoscopy       DL Blade Type: Crisostomo 2       Grade View of Cords: 1       Adjucts: stylet       Position: Right       Measured from: gums/teeth       Secured at (cm): 21       Bite block used: None    Post intubation assessment        Placement verified by: capnometry, equal breath sounds and chest rise        Number of attempts at approach: 1       Number of other approaches attempted: 0       Secured with: silk tape       Ease of procedure: easy       Dentition: Intact and Unchanged    Medication(s) Administered   Medication Administration Time: 6/17/2024 8:59 AM

## 2024-06-17 NOTE — ANESTHESIA CARE TRANSFER NOTE
Patient: Sheridan Ahuja    Procedure: Procedure(s):  INTERSTIM STAGE I LEAD PLACEMENT       Diagnosis: Overactive bladder [N32.81]  Diagnosis Additional Information: No value filed.    Anesthesia Type:   General     Note:    Oropharynx: oropharynx clear of all foreign objects and spontaneously breathing  Level of Consciousness: awake and drowsy  Oxygen Supplementation: face mask  Level of Supplemental Oxygen (L/min / FiO2): 8  Independent Airway: airway patency satisfactory and stable  Dentition: dentition unchanged  Vital Signs Stable: post-procedure vital signs reviewed and stable  Report to RN Given: handoff report given  Patient transferred to: PACU    Handoff Report: Identifed the Patient, Identified the Reponsible Provider, Reviewed the pertinent medical history, Discussed the surgical course, Reviewed Intra-OP anesthesia mangement and issues during anesthesia, Set expectations for post-procedure period and Allowed opportunity for questions and acknowledgement of understanding      Vitals:  Vitals Value Taken Time   /68 06/17/24 1031   Temp 36.2  C (97.1  F) 06/17/24 1030   Pulse 71 06/17/24 1030   Resp 14 06/17/24 1030   SpO2 100 % 06/17/24 1030   Vitals shown include unfiled device data.    Electronically Signed By: ALEJANDRO Diehl CRNA  June 17, 2024  10:32 AM

## 2024-06-17 NOTE — INTERVAL H&P NOTE
"I have reviewed the surgical (or preoperative) H&P that is linked to this encounter, and examined the patient. There are no significant changes    Clinical Conditions Present on Arrival:  Clinically Significant Risk Factors Present on Admission                      # Obesity: Estimated body mass index is 30.29 kg/m  as calculated from the following:    Height as of this encounter: 1.6 m (5' 3\").    Weight as of this encounter: 77.6 kg (171 lb).       "

## 2024-06-28 ENCOUNTER — ANESTHESIA EVENT (OUTPATIENT)
Dept: SURGERY | Facility: CLINIC | Age: 69
End: 2024-06-28
Payer: COMMERCIAL

## 2024-07-01 ENCOUNTER — ANESTHESIA (OUTPATIENT)
Dept: SURGERY | Facility: CLINIC | Age: 69
End: 2024-07-01
Payer: COMMERCIAL

## 2024-07-01 ENCOUNTER — HOSPITAL ENCOUNTER (OUTPATIENT)
Facility: CLINIC | Age: 69
Discharge: HOME | End: 2024-07-01
Attending: UROLOGY | Admitting: UROLOGY
Payer: COMMERCIAL

## 2024-07-01 VITALS
RESPIRATION RATE: 17 BRPM | WEIGHT: 171 LBS | BODY MASS INDEX: 30.3 KG/M2 | DIASTOLIC BLOOD PRESSURE: 68 MMHG | HEART RATE: 73 BPM | OXYGEN SATURATION: 96 % | HEIGHT: 63 IN | TEMPERATURE: 97.5 F | SYSTOLIC BLOOD PRESSURE: 150 MMHG

## 2024-07-01 PROCEDURE — 370N000017 HC ANESTHESIA TECHNICAL FEE, PER MIN: Performed by: UROLOGY

## 2024-07-01 PROCEDURE — 360N000076 HC SURGERY LEVEL 3, PER MIN: Performed by: UROLOGY

## 2024-07-01 PROCEDURE — C1787 PATIENT PROGR, NEUROSTIM: HCPCS | Performed by: UROLOGY

## 2024-07-01 PROCEDURE — 272N000001 HC OR GENERAL SUPPLY STERILE: Performed by: UROLOGY

## 2024-07-01 PROCEDURE — 999N000141 HC STATISTIC PRE-PROCEDURE NURSING ASSESSMENT: Performed by: UROLOGY

## 2024-07-01 PROCEDURE — 258N000003 HC RX IP 258 OP 636

## 2024-07-01 PROCEDURE — 250N000013 HC RX MED GY IP 250 OP 250 PS 637

## 2024-07-01 PROCEDURE — C1767 GENERATOR, NEURO NON-RECHARG: HCPCS | Performed by: UROLOGY

## 2024-07-01 PROCEDURE — 250N000011 HC RX IP 250 OP 636: Performed by: NURSE ANESTHETIST, CERTIFIED REGISTERED

## 2024-07-01 PROCEDURE — 250N000011 HC RX IP 250 OP 636: Performed by: NURSE PRACTITIONER

## 2024-07-01 PROCEDURE — 258N000003 HC RX IP 258 OP 636: Performed by: NURSE ANESTHETIST, CERTIFIED REGISTERED

## 2024-07-01 PROCEDURE — 250N000009 HC RX 250: Performed by: UROLOGY

## 2024-07-01 PROCEDURE — 710N000012 HC RECOVERY PHASE 2, PER MINUTE: Performed by: UROLOGY

## 2024-07-01 DEVICE — NEUROSTIMULATOR MEDT INTERSTIM X RECHARGE-FREE 97800: Type: IMPLANTABLE DEVICE | Site: BACK | Status: FUNCTIONAL

## 2024-07-01 RX ORDER — SODIUM CHLORIDE, SODIUM LACTATE, POTASSIUM CHLORIDE, CALCIUM CHLORIDE 600; 310; 30; 20 MG/100ML; MG/100ML; MG/100ML; MG/100ML
INJECTION, SOLUTION INTRAVENOUS CONTINUOUS
Status: CANCELLED | OUTPATIENT
Start: 2024-07-01

## 2024-07-01 RX ORDER — ONDANSETRON 2 MG/ML
INJECTION INTRAMUSCULAR; INTRAVENOUS PRN
Status: DISCONTINUED | OUTPATIENT
Start: 2024-07-01 | End: 2024-07-01

## 2024-07-01 RX ORDER — SODIUM CHLORIDE, SODIUM LACTATE, POTASSIUM CHLORIDE, CALCIUM CHLORIDE 600; 310; 30; 20 MG/100ML; MG/100ML; MG/100ML; MG/100ML
INJECTION, SOLUTION INTRAVENOUS CONTINUOUS
Status: DISCONTINUED | OUTPATIENT
Start: 2024-07-01 | End: 2024-07-01 | Stop reason: HOSPADM

## 2024-07-01 RX ORDER — LIDOCAINE HYDROCHLORIDE 10 MG/ML
INJECTION, SOLUTION EPIDURAL; INFILTRATION; INTRACAUDAL; PERINEURAL
Status: DISCONTINUED
Start: 2024-07-01 | End: 2024-07-01 | Stop reason: HOSPADM

## 2024-07-01 RX ORDER — HYDROMORPHONE HCL IN WATER/PF 6 MG/30 ML
0.4 PATIENT CONTROLLED ANALGESIA SYRINGE INTRAVENOUS EVERY 5 MIN PRN
Status: CANCELLED | OUTPATIENT
Start: 2024-07-01

## 2024-07-01 RX ORDER — ONDANSETRON 4 MG/1
4 TABLET, ORALLY DISINTEGRATING ORAL EVERY 30 MIN PRN
Status: DISCONTINUED | OUTPATIENT
Start: 2024-07-01 | End: 2024-07-01 | Stop reason: HOSPADM

## 2024-07-01 RX ORDER — OXYCODONE HYDROCHLORIDE 5 MG/1
10 TABLET ORAL
Status: DISCONTINUED | OUTPATIENT
Start: 2024-07-01 | End: 2024-07-01 | Stop reason: HOSPADM

## 2024-07-01 RX ORDER — FENTANYL CITRATE 50 UG/ML
25 INJECTION, SOLUTION INTRAMUSCULAR; INTRAVENOUS EVERY 5 MIN PRN
Status: CANCELLED | OUTPATIENT
Start: 2024-07-01

## 2024-07-01 RX ORDER — BUPIVACAINE HYDROCHLORIDE AND EPINEPHRINE 5; 5 MG/ML; UG/ML
INJECTION, SOLUTION EPIDURAL; INTRACAUDAL; PERINEURAL
Status: DISCONTINUED
Start: 2024-07-01 | End: 2024-07-01 | Stop reason: HOSPADM

## 2024-07-01 RX ORDER — HYDROMORPHONE HCL IN WATER/PF 6 MG/30 ML
0.2 PATIENT CONTROLLED ANALGESIA SYRINGE INTRAVENOUS EVERY 5 MIN PRN
Status: CANCELLED | OUTPATIENT
Start: 2024-07-01

## 2024-07-01 RX ORDER — DEXAMETHASONE SODIUM PHOSPHATE 10 MG/ML
4 INJECTION, SOLUTION INTRAMUSCULAR; INTRAVENOUS
Status: DISCONTINUED | OUTPATIENT
Start: 2024-07-01 | End: 2024-07-01 | Stop reason: HOSPADM

## 2024-07-01 RX ORDER — ACETAMINOPHEN 325 MG/1
650 TABLET ORAL ONCE
Status: DISCONTINUED | OUTPATIENT
Start: 2024-07-01 | End: 2024-07-01 | Stop reason: HOSPADM

## 2024-07-01 RX ORDER — ONDANSETRON 4 MG/1
4 TABLET, ORALLY DISINTEGRATING ORAL EVERY 30 MIN PRN
Status: CANCELLED | OUTPATIENT
Start: 2024-07-01

## 2024-07-01 RX ORDER — FENTANYL CITRATE 50 UG/ML
50 INJECTION, SOLUTION INTRAMUSCULAR; INTRAVENOUS EVERY 5 MIN PRN
Status: CANCELLED | OUTPATIENT
Start: 2024-07-01

## 2024-07-01 RX ORDER — BUPIVACAINE HYDROCHLORIDE AND EPINEPHRINE 5; 5 MG/ML; UG/ML
INJECTION, SOLUTION EPIDURAL; INTRACAUDAL; PERINEURAL PRN
Status: DISCONTINUED | OUTPATIENT
Start: 2024-07-01 | End: 2024-07-01 | Stop reason: HOSPADM

## 2024-07-01 RX ORDER — HYDROCODONE BITARTRATE AND ACETAMINOPHEN 5; 325 MG/1; MG/1
1 TABLET ORAL ONCE
Status: DISCONTINUED | OUTPATIENT
Start: 2024-07-01 | End: 2024-07-01 | Stop reason: HOSPADM

## 2024-07-01 RX ORDER — ONDANSETRON 2 MG/ML
4 INJECTION INTRAMUSCULAR; INTRAVENOUS EVERY 30 MIN PRN
Status: CANCELLED | OUTPATIENT
Start: 2024-07-01

## 2024-07-01 RX ORDER — FENTANYL CITRATE 50 UG/ML
INJECTION, SOLUTION INTRAMUSCULAR; INTRAVENOUS PRN
Status: DISCONTINUED | OUTPATIENT
Start: 2024-07-01 | End: 2024-07-01

## 2024-07-01 RX ORDER — OXYCODONE HYDROCHLORIDE 5 MG/1
5 TABLET ORAL
Status: DISCONTINUED | OUTPATIENT
Start: 2024-07-01 | End: 2024-07-01 | Stop reason: HOSPADM

## 2024-07-01 RX ORDER — DEXAMETHASONE SODIUM PHOSPHATE 10 MG/ML
4 INJECTION, SOLUTION INTRAMUSCULAR; INTRAVENOUS
Status: CANCELLED | OUTPATIENT
Start: 2024-07-01

## 2024-07-01 RX ORDER — NALOXONE HYDROCHLORIDE 0.4 MG/ML
0.1 INJECTION, SOLUTION INTRAMUSCULAR; INTRAVENOUS; SUBCUTANEOUS
Status: CANCELLED | OUTPATIENT
Start: 2024-07-01

## 2024-07-01 RX ORDER — PROPOFOL 10 MG/ML
INJECTION, EMULSION INTRAVENOUS CONTINUOUS PRN
Status: DISCONTINUED | OUTPATIENT
Start: 2024-07-01 | End: 2024-07-01

## 2024-07-01 RX ORDER — CEFAZOLIN SODIUM/WATER 2 G/20 ML
2 SYRINGE (ML) INTRAVENOUS SEE ADMIN INSTRUCTIONS
Status: DISCONTINUED | OUTPATIENT
Start: 2024-07-01 | End: 2024-07-01 | Stop reason: HOSPADM

## 2024-07-01 RX ORDER — LIDOCAINE 40 MG/G
CREAM TOPICAL
Status: DISCONTINUED | OUTPATIENT
Start: 2024-07-01 | End: 2024-07-01 | Stop reason: HOSPADM

## 2024-07-01 RX ORDER — CEFAZOLIN SODIUM/WATER 2 G/20 ML
2 SYRINGE (ML) INTRAVENOUS
Status: COMPLETED | OUTPATIENT
Start: 2024-07-01 | End: 2024-07-01

## 2024-07-01 RX ORDER — ONDANSETRON 2 MG/ML
4 INJECTION INTRAMUSCULAR; INTRAVENOUS EVERY 30 MIN PRN
Status: DISCONTINUED | OUTPATIENT
Start: 2024-07-01 | End: 2024-07-01 | Stop reason: HOSPADM

## 2024-07-01 RX ORDER — NALOXONE HYDROCHLORIDE 0.4 MG/ML
0.1 INJECTION, SOLUTION INTRAMUSCULAR; INTRAVENOUS; SUBCUTANEOUS
Status: DISCONTINUED | OUTPATIENT
Start: 2024-07-01 | End: 2024-07-01 | Stop reason: HOSPADM

## 2024-07-01 RX ORDER — LIDOCAINE HYDROCHLORIDE 10 MG/ML
INJECTION, SOLUTION INFILTRATION; PERINEURAL PRN
Status: DISCONTINUED | OUTPATIENT
Start: 2024-07-01 | End: 2024-07-01 | Stop reason: HOSPADM

## 2024-07-01 RX ORDER — ACETAMINOPHEN 325 MG/1
975 TABLET ORAL ONCE
Status: COMPLETED | OUTPATIENT
Start: 2024-07-01 | End: 2024-07-01

## 2024-07-01 RX ADMIN — Medication 2 G: at 07:08

## 2024-07-01 RX ADMIN — PROPOFOL 100 MCG/KG/MIN: 10 INJECTION, EMULSION INTRAVENOUS at 07:13

## 2024-07-01 RX ADMIN — MIDAZOLAM 2 MG: 1 INJECTION INTRAMUSCULAR; INTRAVENOUS at 07:13

## 2024-07-01 RX ADMIN — PHENYLEPHRINE HYDROCHLORIDE 100 MCG: 10 INJECTION INTRAVENOUS at 07:37

## 2024-07-01 RX ADMIN — FENTANYL CITRATE 50 MCG: 50 INJECTION INTRAMUSCULAR; INTRAVENOUS at 07:19

## 2024-07-01 RX ADMIN — ACETAMINOPHEN 975 MG: 325 TABLET ORAL at 06:19

## 2024-07-01 RX ADMIN — SODIUM CHLORIDE, POTASSIUM CHLORIDE, SODIUM LACTATE AND CALCIUM CHLORIDE: 600; 310; 30; 20 INJECTION, SOLUTION INTRAVENOUS at 06:30

## 2024-07-01 RX ADMIN — ONDANSETRON 4 MG: 2 INJECTION INTRAMUSCULAR; INTRAVENOUS at 07:24

## 2024-07-01 RX ADMIN — FENTANYL CITRATE 50 MCG: 50 INJECTION INTRAMUSCULAR; INTRAVENOUS at 07:34

## 2024-07-01 ASSESSMENT — ACTIVITIES OF DAILY LIVING (ADL)
ADLS_ACUITY_SCORE: 20
ADLS_ACUITY_SCORE: 20
ADLS_ACUITY_SCORE: 35

## 2024-07-01 NOTE — OP NOTE
Date of Service: 7-1-2024     Surgeon(s):   Tammi Maxwell MD        Preoperative Diagnosis: Overactive bladder urge incontinence  Postoperative Diagnosis: Overactive bladder urge incontinence      Procedure(s):    Internal Pulse Generator X placement with impedence check      Procedure findings:   IPG X and Impendeces wnl      (EBL) Estimated blood loss (ml): 1cc      Anesthesia: Local, sedation          Indications:       70 yo female with history of OVERACTIVE BLADDER/urge incontinence. She was seen and evaluated at [facility] and elected for interstim with  successful InterStim trial with > 50% success documented on diaries. All risk/benefits and alternatives were discussed and she agreed to proceed.      Procedure:    Patient was correctly identified and informed consent was obtained. She was prepped and drapped in the usual sterile surgicla fashion prone. . Anesthesia was given. Pressure points were padded appropriately. A time out was called with everyone in the room in agreement. IV antibiotics were dosed     Attention was then drawn to the upper buttock. Local anesthesia was given. A 4 cm horizontal incision was made. The connection device was reoved from the pocket and it was cut. The pocket was irrigated. I created a pocket for the IPG in the subcutneous tissues. It was irrigated and hemostatic. The lead was connected it to the IPG X with screw to hear a click. The IPG was placed into the pocket nicely. An impedence check was carried out and was within normal limits.      The incisions were then closed with a 2-vicryl in an interrupted 2 layer fashion and the skin was closed with a 3-0 strafix.The incisions were cleaned and dermabond was applied.      All sponge and instruments counts were correct. There were no complications. I was present and scrubbed for the entire case.   She will be programmed with the [email or web address] representative in the PACU and follow-up with [facility] within the  week.

## 2024-07-01 NOTE — BRIEF OP NOTE
[facility]    Brief Operative Note    Pre-operative diagnosis: Overactive bladder [N32.81]  Post-operative diagnosis Same as pre-operative diagnosis    Procedure: INTERSTIM STAGE II INTERNAL PULSE GENERATOR PLACEMENT AND IMPEDANCE CHECK, N/A - Bladder    Surgeon: Surgeons and Role:     * Tammi Maxwell MD - Primary  Anesthesia: Choice   Estimated Blood Loss: None    Drains: None  Specimens: * No specimens in log *  Findings:   None.  Complications: None.  Implants:   Implant Name Type Inv. Item Serial No.  Lot No. LRB No. Used Action   CABLE EXTENSION 4.32MM [id number] - [id number] Leads CABLE EXTENSION 4.32MM [id number]  [employer] [id number] N/A 1 Explanted   NEUROSTIMULATOR MEDT INTERSTIM X RECHARGE-FREE [id number] - [id number] Neurology device NEUROSTIMULATOR MEDT INTERSTIM X RECHARGE-FREE [id number]  [employer]  N/A 1 Implanted

## 2024-07-01 NOTE — ANESTHESIA POSTPROCEDURE EVALUATION
Patient: Sheridan Ahuja    Procedure: Procedure(s):  INTERSTIM STAGE II INTERNAL PULSE GENERATOR PLACEMENT AND IMPEDANCE CHECK       Anesthesia Type:  MAC    Note:  Disposition: Outpatient   Postop Pain Control:             Sign Out: Well controlled pain   PONV: No   Neuro/Psych:             Sign Out: Acceptable/Baseline neuro status   Airway/Respiratory:             Sign Out: Acceptable/Baseline resp. status   CV/Hemodynamics:             Sign Out: Acceptable CV status   Other NRE: NONE   DID A NON-ROUTINE EVENT OCCUR?            Last vitals:  Vitals Value Taken Time   /68 07/01/24 0830   Temp 36.4  C (97.5  F) 07/01/24 0830   Pulse 73 07/01/24 0830   Resp 17 07/01/24 0830   SpO2 96 % 07/01/24 0830       Electronically Signed By: Shantanu Ruvalcaba MD  July 1, 2024  1:17 PM

## 2024-07-01 NOTE — ANESTHESIA PREPROCEDURE EVALUATION
"Anesthesia Pre-Procedure Evaluation    Patient: Sheridan Ahuja   MRN: [id number] : 1955        Procedure : Procedure(s):  INTERSTIM STAGE I LEAD PLACEMENT          Past Medical History:   Diagnosis Date    Gastroesophageal reflux disease     Sleep apnea       Past Surgical History:   Procedure Laterality Date    INSERTION, NEUROSTIMULATOR, BLADDER, STAGE 1 N/A 2024    Procedure: INTERSTIM STAGE I LEAD PLACEMENT;  Surgeon: Tammi Maxwell MD;  Location: [facility] Main OR      Allergies   Allergen Reactions    Aspirin Swelling    Other Drug Allergy (See Comments) Swelling     Several bladder meds caused swelling but patient doesn't remember the names of the meds      Social History     Tobacco Use    Smoking status: Never    Smokeless tobacco: Never   Substance Use Topics    Alcohol use: Yes     Comment: 3-4 a week      Wt Readings from Last 1 Encounters:   24 77.6 kg (171 lb)        Anesthesia Evaluation   Pt has had prior anesthetic.     No history of anesthetic complications       ROS/MED HX  ENT/Pulmonary:     (+) sleep apnea,                                       Neurologic:  - neg neurologic ROS     Cardiovascular:  - neg cardiovascular ROS     METS/Exercise Tolerance:     Hematologic:  - neg hematologic  ROS     Musculoskeletal:       GI/Hepatic:     (+) GERD (controlled),                   Renal/Genitourinary:  - neg Renal ROS     Endo:  - neg endo ROS     Psychiatric/Substance Use:       Infectious Disease:       Malignancy:       Other:            Physical Exam    Airway        Mallampati: III   TM distance: > 3 FB   Neck ROM: full   Mouth opening: > 3 cm    Respiratory Devices and Support         Dental       (+) Minor Abnormalities - some fillings, tiny chips      Cardiovascular   cardiovascular exam normal          Pulmonary   pulmonary exam normal                OUTSIDE LABS:  CBC: No results found for: \"WBC\", \"HGB\", \"HCT\", \"PLT\"  BMP: No results found for: \"NA\", \"POTASSIUM\", " "\"CHLORIDE\", \"CO2\", \"BUN\", \"CR\", \"GLC\"  COAGS: No results found for: \"PTT\", \"INR\", \"FIBR\"  POC: No results found for: \"BGM\", \"HCG\", \"HCGS\"  HEPATIC: No results found for: \"ALBUMIN\", \"PROTTOTAL\", \"ALT\", \"AST\", \"GGT\", \"ALKPHOS\", \"BILITOTAL\", \"BILIDIRECT\", \"MARY ANN\"  OTHER: No results found for: \"PH\", \"LACT\", \"A1C\", \"ALEXA\", \"PHOS\", \"MAG\", \"LIPASE\", \"AMYLASE\", \"TSH\", \"T4\", \"T3\", \"CRP\", \"SED\"    Anesthesia Plan    ASA Status:  3    NPO Status:  NPO Appropriate    Anesthesia Type: MAC.   Induction: Intravenous.           Consents    Anesthesia Plan(s) and associated risks, benefits, and realistic alternatives discussed. Questions answered and patient/representative(s) expressed understanding.     - Discussed: Risks, Benefits and Alternatives for the PROCEDURE were discussed     - Discussed with:  Patient            Postoperative Care    Pain management: IV analgesics, Oral pain medications.        Comments:               Shantanu Ruvalcaba MD    I have reviewed the pertinent notes and labs in the chart from the past 30 days and (re)examined the patient.  Any updates or changes from those notes are reflected in this note.              # Obesity: Estimated body mass index is 30.29 kg/m  as calculated from the following:    Height as of this encounter: 1.6 m (5' 3\").    Weight as of this encounter: 77.6 kg (171 lb).      "

## 2024-07-01 NOTE — ANESTHESIA CARE TRANSFER NOTE
Patient: Sheridan Ahuja    Procedure: Procedure(s):  INTERSTIM STAGE II INTERNAL PULSE GENERATOR PLACEMENT AND IMPEDANCE CHECK       Diagnosis: Overactive bladder [N32.81]  Diagnosis Additional Information: No value filed.    Anesthesia Type:   MAC     Note:    Oropharynx: oropharynx clear of all foreign objects  Level of Consciousness: awake  Oxygen Supplementation: room air    Independent Airway: airway patency satisfactory and stable  Dentition: dentition unchanged  Vital Signs Stable: post-procedure vital signs reviewed and stable  Report to RN Given: handoff report given  Patient transferred to: Phase II    Handoff Report: Identifed the Patient, Identified the Reponsible Provider, Reviewed the pertinent medical history, Discussed the surgical course, Reviewed Intra-OP anesthesia mangement and issues during anesthesia, Set expectations for post-procedure period and Allowed opportunity for questions and acknowledgement of understanding      Vitals:  Vitals Value Taken Time   /54 07/01/24 0747   Temp 96.3    Pulse 69 07/01/24 0749   Resp 14    SpO2 95 % 07/01/24 0749   Vitals shown include unfiled device data.    Electronically Signed By: ALEJANDRO Meza CRNA  July 1, 2024  7:50 AM

## (undated) DEVICE — GOWN LG DISP 9515

## (undated) DEVICE — DRAPE IOBAN INCISE 23X17" 6650EZ

## (undated) DEVICE — SOL WATER IRRIG 1000ML BOTTLE 2F7114

## (undated) DEVICE — SUTURE VICRYL+ 2-0 27IN CT-1 UND VCP259H

## (undated) DEVICE — SYR 10ML LL W/O NDL 302995

## (undated) DEVICE — DRAPE OR LAPAROTOMY KC 89228*

## (undated) DEVICE — GLOVE PI ULTRATCH M LF SZ 6.5 PF CUFF TEXT STRL LF 42665

## (undated) DEVICE — SUCTION MANIFOLD NEPTUNE 2 SYS 1 PORT 702-025-000

## (undated) DEVICE — DRSG KERLIX FLUFFS X5

## (undated) DEVICE — SU PLAIN FAST ABSORB 5-0 PC-1 18" 1915G

## (undated) DEVICE — COVER ULTRASOUND PROBE FLEXI-FEEL 4X96" 25-FF496

## (undated) DEVICE — DRAPE C-ARM 60X42" 1013

## (undated) DEVICE — NEEDLE HYPO 25X1 SAFETY 305916

## (undated) DEVICE — ELECTRODE PATIENT RETURN ADULT L10 FT 2 PLATE CORD 0855C

## (undated) DEVICE — TUBING SUCTION MEDI-VAC 1/4"X20' N620A

## (undated) DEVICE — NDL INTERSTIM MEDT FORAMEN 18.5GA 5.0" 041839

## (undated) DEVICE — SYR 20ML LL W/O NDL 302830

## (undated) DEVICE — SU STRATAFIX MONOCRYL 3-0 SPIRAL PS-2 30CM SXMP1B106

## (undated) DEVICE — STIMULATOR EXTERNAL VERIFY MEDTRONIC INTERSTIM 353101

## (undated) DEVICE — SU PROLENE 0 CT-2 30" 8412H

## (undated) DEVICE — DRSG MEPILEX BORDER FLEX 3X3" 595200

## (undated) DEVICE — DRSG MEPILEX BORDER FLEX 8.7X9.8" 282455

## (undated) DEVICE — PREP CHLORAPREP 26ML TINTED HI-LITE ORANGE 930815

## (undated) DEVICE — BLADE KNIFE SURG 15 371115

## (undated) DEVICE — SYR EAR 3OZ BULB IRR STRL DISP BLU PVC 4173

## (undated) DEVICE — ADH REMOVER PAD UNI-SOLVE 402300

## (undated) DEVICE — DRSG TEGADERM 4X4 3/4" 1626W

## (undated) DEVICE — PROGRAMMER MEDT COMM HANDSET INTERSTIM X TH90Q01

## (undated) DEVICE — SUTURE MONOCRYL+ 4-0 PS-2 27IN MCP426H

## (undated) DEVICE — SUCTION TIP YANKAUER W/O VENT K86

## (undated) DEVICE — DRAPE C-ARMOR 5 SIDED 5523

## (undated) DEVICE — SOL ISOPROPYL RUBBING ALCOHOL USP 70% 4OZ HDX-20 I0020

## (undated) DEVICE — ESU PENCIL SMOKE EVAC W/ROCKER SWITCH 0703-047-000

## (undated) DEVICE — SU MONOCRYL+ 4-0 18IN PS2 UND MCP496G

## (undated) DEVICE — CUSTOM PACK GEN MAJOR SBA5BGMHEA

## (undated) DEVICE — SU DERMABOND ADVANCED .7ML DNX12

## (undated) DEVICE — CUSTOM PACK MINOR SBA5BMNHEA

## (undated) DEVICE — DRSG GAUZE 4X4" 3033

## (undated) RX ORDER — PROPOFOL 10 MG/ML
INJECTION, EMULSION INTRAVENOUS
Status: DISPENSED
Start: 2024-07-01

## (undated) RX ORDER — DEXAMETHASONE SODIUM PHOSPHATE 10 MG/ML
INJECTION, EMULSION INTRAMUSCULAR; INTRAVENOUS
Status: DISPENSED
Start: 2024-06-17

## (undated) RX ORDER — FENTANYL CITRATE-0.9 % NACL/PF 10 MCG/ML
PLASTIC BAG, INJECTION (ML) INTRAVENOUS
Status: DISPENSED
Start: 2024-07-01

## (undated) RX ORDER — PROPOFOL 10 MG/ML
INJECTION, EMULSION INTRAVENOUS
Status: DISPENSED
Start: 2024-06-17

## (undated) RX ORDER — FENTANYL CITRATE 50 UG/ML
INJECTION, SOLUTION INTRAMUSCULAR; INTRAVENOUS
Status: DISPENSED
Start: 2024-07-01

## (undated) RX ORDER — FENTANYL CITRATE 50 UG/ML
INJECTION, SOLUTION INTRAMUSCULAR; INTRAVENOUS
Status: DISPENSED
Start: 2024-06-17

## (undated) RX ORDER — ONDANSETRON 2 MG/ML
INJECTION INTRAMUSCULAR; INTRAVENOUS
Status: DISPENSED
Start: 2024-07-01

## (undated) RX ORDER — LIDOCAINE HYDROCHLORIDE 10 MG/ML
INJECTION, SOLUTION EPIDURAL; INFILTRATION; INTRACAUDAL; PERINEURAL
Status: DISPENSED
Start: 2024-07-01